# Patient Record
Sex: FEMALE | Race: WHITE | Employment: UNEMPLOYED | URBAN - METROPOLITAN AREA
[De-identification: names, ages, dates, MRNs, and addresses within clinical notes are randomized per-mention and may not be internally consistent; named-entity substitution may affect disease eponyms.]

---

## 2022-03-14 ENCOUNTER — HOSPITAL ENCOUNTER (EMERGENCY)
Facility: CLINIC | Age: 62
Discharge: HOME | End: 2022-03-14
Attending: EMERGENCY MEDICINE
Payer: COMMERCIAL

## 2022-03-14 VITALS
RESPIRATION RATE: 16 BRPM | WEIGHT: 200 LBS | BODY MASS INDEX: 31.39 KG/M2 | HEIGHT: 67 IN | DIASTOLIC BLOOD PRESSURE: 76 MMHG | OXYGEN SATURATION: 98 % | HEART RATE: 77 BPM | SYSTOLIC BLOOD PRESSURE: 128 MMHG | TEMPERATURE: 97.2 F

## 2022-03-14 DIAGNOSIS — B96.89 BACTERIAL VAGINOSIS: Primary | ICD-10-CM

## 2022-03-14 DIAGNOSIS — N76.0 BACTERIAL VAGINOSIS: Primary | ICD-10-CM

## 2022-03-14 LAB
BILIRUBIN URINE: NEGATIVE
CANDIDA SPECIES, DNA PROBE: NEGATIVE
COLOR: YELLOW
COMMENT UA: NORMAL
GARDNERELLA VAGINALIS, DNA PROBE: POSITIVE
GLUCOSE URINE: NEGATIVE
KETONES, URINE: NEGATIVE
LEUKOCYTE ESTERASE, URINE: NEGATIVE
NITRITE, URINE: NEGATIVE
PH UA: 5.5 (ref 5–8)
PROTEIN UA: NEGATIVE
SOURCE: ABNORMAL
SPECIFIC GRAVITY UA: 1.02 (ref 1–1.03)
TRICHOMONAS VAGINALIS DNA: NEGATIVE
TURBIDITY: CLEAR
URINE HGB: NEGATIVE
UROBILINOGEN, URINE: NORMAL

## 2022-03-14 PROCEDURE — 87510 GARDNER VAG DNA DIR PROBE: CPT

## 2022-03-14 PROCEDURE — 87660 TRICHOMONAS VAGIN DIR PROBE: CPT

## 2022-03-14 PROCEDURE — 87480 CANDIDA DNA DIR PROBE: CPT

## 2022-03-14 PROCEDURE — 6370000000 HC RX 637 (ALT 250 FOR IP): Performed by: REGISTERED NURSE

## 2022-03-14 PROCEDURE — 99283 EMERGENCY DEPT VISIT LOW MDM: CPT

## 2022-03-14 PROCEDURE — 81003 URINALYSIS AUTO W/O SCOPE: CPT

## 2022-03-14 RX ORDER — METRONIDAZOLE 500 MG/1
500 TABLET ORAL ONCE
Status: COMPLETED | OUTPATIENT
Start: 2022-03-14 | End: 2022-03-14

## 2022-03-14 RX ORDER — LIDOCAINE HYDROCHLORIDE 20 MG/ML
JELLY TOPICAL ONCE
Status: COMPLETED | OUTPATIENT
Start: 2022-03-14 | End: 2022-03-14

## 2022-03-14 RX ORDER — METRONIDAZOLE 500 MG/1
500 TABLET ORAL 2 TIMES DAILY
Qty: 14 TABLET | Refills: 0 | Status: SHIPPED | OUTPATIENT
Start: 2022-03-14 | End: 2022-03-21

## 2022-03-14 RX ADMIN — LIDOCAINE HYDROCHLORIDE: 20 JELLY TOPICAL at 22:12

## 2022-03-14 RX ADMIN — METRONIDAZOLE 500 MG: 500 TABLET ORAL at 22:12

## 2022-03-14 ASSESSMENT — PAIN SCALES - GENERAL: PAINLEVEL_OUTOF10: 4

## 2022-03-14 ASSESSMENT — ENCOUNTER SYMPTOMS
GASTROINTESTINAL NEGATIVE: 1
RESPIRATORY NEGATIVE: 1
EYES NEGATIVE: 1

## 2022-03-15 NOTE — ED TRIAGE NOTES
Patient states left side inner labia pain for one week. Possible vaginal laceration when looking with a mirror. Patient states she was dx with lichen schorlosis in the past and states symptoms feel the same.

## 2022-03-15 NOTE — ED PROVIDER NOTES
Attending Supervising Physicians Attestation Statement  The patient met the criteria for indirect supervision. I discussed the findings and plans with the nurse practitioner and agree as documented in his note .     Electronically signed by Dago Cardenas MD on 3/14/22 at 8:59 PM EDT          Dago Cardenas MD  03/14/22 1733

## 2024-02-21 NOTE — ED NOTES
Assisted Franky PA with pelvic exam. Patient tolerated. Specimen collected and sent to lab.       Sveta Farris RN  03/14/22 2543
Home